# Patient Record
Sex: FEMALE | Race: WHITE
[De-identification: names, ages, dates, MRNs, and addresses within clinical notes are randomized per-mention and may not be internally consistent; named-entity substitution may affect disease eponyms.]

---

## 2018-12-06 ENCOUNTER — HOSPITAL ENCOUNTER (OUTPATIENT)
Dept: HOSPITAL 89 - MAMO | Age: 63
End: 2018-12-06
Attending: PHYSICIAN ASSISTANT
Payer: COMMERCIAL

## 2018-12-06 DIAGNOSIS — Z12.31: Primary | ICD-10-CM

## 2018-12-06 PROCEDURE — 77063 BREAST TOMOSYNTHESIS BI: CPT

## 2018-12-06 PROCEDURE — 77067 SCR MAMMO BI INCL CAD: CPT

## 2018-12-07 NOTE — RADIOLOGY IMAGING REPORT
FACILITY: [facility] [place] 

 

PATIENT NAME: JOSEF YOUNG

: [id number]

MR: [id number]

V: [id number]

EXAM DATE: [id number]

ORDERING PHYSICIAN: TRAV SOTO

TECHNOLOGIST: Mehnaz Lozada

 

PROCEDURE:BILATERAL DIGITAL SCREENING MAMMOGRAM WITH CAD ASSISTED 

INTERPRETATION & 3D TOMOSYNTHESIS 

 

COMPARISON:Prior mammograms 17, 16, 9/15/15, 14, 

13, 12.

 

INDICATIONS:SCREENING

 

FINDINGS:  

The breasts are almost entirely fatty. The parenchymal pattern has 

remained stable allowing for difference in mammographic technique & 

patient positioning. There is no evidence of malignant appearing mass, 

malignant appearing calcifications or other secondary sign of 

malignancy in either breast.

 

DIAGNOSTIC CATEGORY 1--NEGATIVE.  

 

RECOMMENDATIONS:

ROUTINE MAMMOGRAM AND CLINICAL EVALUATION.   

 

IMPRESSION: 

BIRADS 1: Negative.

No significant abnormality is seen.

 

 

 

 

 

 

 

 

 

Dictated by:  Mai Singer M.D. on 2018 at 17:17   

Transcribed by: FIX on 2018 at 8:35    

Approved by:  Mai Singer M.D. on 2018 at 13:30   

[facility]